# Patient Record
Sex: MALE | URBAN - METROPOLITAN AREA
[De-identification: names, ages, dates, MRNs, and addresses within clinical notes are randomized per-mention and may not be internally consistent; named-entity substitution may affect disease eponyms.]

---

## 2023-12-26 ENCOUNTER — OFFICE VISIT (OUTPATIENT)
Dept: URBAN - METROPOLITAN AREA CLINIC 84 | Facility: CLINIC | Age: 48
End: 2023-12-26
Payer: COMMERCIAL

## 2023-12-26 ENCOUNTER — DASHBOARD ENCOUNTERS (OUTPATIENT)
Age: 48
End: 2023-12-26

## 2023-12-26 VITALS
HEART RATE: 76 BPM | DIASTOLIC BLOOD PRESSURE: 79 MMHG | SYSTOLIC BLOOD PRESSURE: 117 MMHG | TEMPERATURE: 97.1 F | BODY MASS INDEX: 37.9 KG/M2 | HEIGHT: 66 IN | WEIGHT: 235.8 LBS | RESPIRATION RATE: 76 BRPM

## 2023-12-26 DIAGNOSIS — Z12.11 COLON CANCER SCREENING: ICD-10-CM

## 2023-12-26 PROCEDURE — 99202 OFFICE O/P NEW SF 15 MIN: CPT

## 2023-12-26 RX ORDER — POLYETHYLENE GLYCOL 3350, SODIUM SULFATE ANHYDROUS, SODIUM BICARBONATE, SODIUM CHLORIDE, POTASSIUM CHLORIDE 236; 22.74; 6.74; 5.86; 2.97 G/4L; G/4L; G/4L; G/4L; G/4L
AS DIRECTED POWDER, FOR SOLUTION ORAL
Qty: 1 | Refills: 0 | OUTPATIENT
Start: 2023-12-26 | End: 2023-12-27

## 2023-12-26 RX ORDER — OLMESARTAN MEDOXOMIL 40 MG/1
TAKE 1 TABLET BY MOUTH EVERY DAY TABLET ORAL
Qty: 90 EACH | Refills: 0 | Status: ACTIVE | COMMUNITY

## 2023-12-26 RX ORDER — SILDENAFIL 100 MG/1
TAKE 1 TABLET BY MOUTH EVERY DAY TABLET, FILM COATED ORAL
Qty: 6 EACH | Refills: 0 | Status: ON HOLD | COMMUNITY

## 2023-12-26 RX ORDER — HYDROCHLOROTHIAZIDE 25 MG/1
TAKE 1 TABLET BY MOUTH EVERY DAY TABLET ORAL
Qty: 90 EACH | Refills: 0 | Status: ACTIVE | COMMUNITY

## 2023-12-26 RX ORDER — AMLODIPINE BESYLATE 10 MG/1
TABLET ORAL
Qty: 90 TABLET | Status: ACTIVE | COMMUNITY

## 2023-12-26 NOTE — HPI-TODAY'S VISIT:
Mr. Castellano is a 48y M, he  presents to the clinic for colon cancer screening  No change in bowel habits, 1-2 daily BM's denies hard stools/Straining  No change in weight No change in appetite No n/v No BRBPR, no Melena No abdominal pain No SOB, no CP Last colonoscopy: N/A  No known family h/o colon cancer/polyps. Denies cardiac hardware, dialysis, blood thinner use, and or issues with anesthesia - ARIADNE w/ difficult airway**

## 2023-12-28 ENCOUNTER — CLAIMS CREATED FROM THE CLAIM WINDOW (OUTPATIENT)
Dept: URBAN - METROPOLITAN AREA SURGERY CENTER 20 | Facility: SURGERY CENTER | Age: 48
End: 2023-12-28
Payer: COMMERCIAL

## 2023-12-28 ENCOUNTER — CLAIMS CREATED FROM THE CLAIM WINDOW (OUTPATIENT)
Dept: URBAN - METROPOLITAN AREA SURGERY CENTER 20 | Facility: SURGERY CENTER | Age: 48
End: 2023-12-28

## 2023-12-28 DIAGNOSIS — K64.8 OTHER HEMORRHOIDS: ICD-10-CM

## 2023-12-28 DIAGNOSIS — Z12.11 COLON CANCER SCREENING: ICD-10-CM

## 2023-12-28 DIAGNOSIS — K57.30 DIVERTICULA OF COLON: ICD-10-CM

## 2023-12-28 DIAGNOSIS — K63.5 COLONIC POLYPS: ICD-10-CM

## 2023-12-28 DIAGNOSIS — D12.3 ADENOMA OF TRANSVERSE COLON: ICD-10-CM

## 2023-12-28 PROCEDURE — 00811 ANES LWR INTST NDSC NOS: CPT | Performed by: NURSE ANESTHETIST, CERTIFIED REGISTERED

## 2023-12-28 PROCEDURE — 45385 COLONOSCOPY W/LESION REMOVAL: CPT | Performed by: INTERNAL MEDICINE

## 2023-12-28 PROCEDURE — G8907 PT DOC NO EVENTS ON DISCHARG: HCPCS | Performed by: INTERNAL MEDICINE
